# Patient Record
Sex: FEMALE | Race: AMERICAN INDIAN OR ALASKA NATIVE
[De-identification: names, ages, dates, MRNs, and addresses within clinical notes are randomized per-mention and may not be internally consistent; named-entity substitution may affect disease eponyms.]

---

## 2017-11-20 ENCOUNTER — HOSPITAL ENCOUNTER (OUTPATIENT)
Dept: HOSPITAL 5 - GIO | Age: 62
Discharge: HOME | End: 2017-11-20
Attending: INTERNAL MEDICINE
Payer: OTHER GOVERNMENT

## 2017-11-20 VITALS — DIASTOLIC BLOOD PRESSURE: 64 MMHG | SYSTOLIC BLOOD PRESSURE: 108 MMHG

## 2017-11-20 DIAGNOSIS — Z87.891: ICD-10-CM

## 2017-11-20 DIAGNOSIS — M17.9: ICD-10-CM

## 2017-11-20 DIAGNOSIS — D17.79: ICD-10-CM

## 2017-11-20 DIAGNOSIS — D12.0: ICD-10-CM

## 2017-11-20 DIAGNOSIS — K64.8: ICD-10-CM

## 2017-11-20 DIAGNOSIS — Z79.899: ICD-10-CM

## 2017-11-20 DIAGNOSIS — K57.30: ICD-10-CM

## 2017-11-20 DIAGNOSIS — E66.9: ICD-10-CM

## 2017-11-20 DIAGNOSIS — I10: ICD-10-CM

## 2017-11-20 DIAGNOSIS — M19.019: ICD-10-CM

## 2017-11-20 DIAGNOSIS — Z12.11: Primary | ICD-10-CM

## 2017-11-20 PROCEDURE — 45384 COLONOSCOPY W/LESION REMOVAL: CPT

## 2017-11-20 PROCEDURE — 45388 COLONOSCOPY W/ABLATION: CPT

## 2017-11-20 PROCEDURE — 88305 TISSUE EXAM BY PATHOLOGIST: CPT

## 2017-11-20 NOTE — HISTORY AND PHYSICAL REPORT
History of Present Illness


Date of examination: 11/20/17


Chief complaint: 


Colorectal cancer screening





History of present illness: 


Patient is a 62-year-old female who is referred for colorectal cancer 

screening.  Denies any additional complaints.








Past History


Past Medical History: hypertension, other (degenerative arthritis)





Medications and Allergies


 Allergies











Allergy/AdvReac Type Severity Reaction Status Date / Time


 


No Known Allergies Allergy   Unverified 07/07/14 12:42











 Home Medications











 Medication  Instructions  Recorded  Confirmed  Last Taken  Type


 


Meloxicam [Meloxicam] 15 mg PO PRN PRN 11/20/17 11/20/17 11/18/17 09:00 History


 


Telmisartan/Hydrochlorothiazid 1 tab PO DAILY 11/20/17 11/20/17 11/20/17 08:00 

History





[Micardis Hct 80-25 mg Tablet]     











Active Meds: 


Active Medications





Sodium Chloride (Nacl 0.9% 1000 Ml)  1,000 mls @ 50 mls/hr IV AS DIRECT VENKATA


   Last Admin: 11/20/17 12:35 Dose:  400 mls











Review of Systems


All systems: negative





Exam





- Constitutional


Vitals: 


 











Temp Pulse Resp BP Pulse Ox


 


 98.3 F   75   16   141/79    


 


 11/20/17 08:42  11/20/17 08:42  11/20/17 08:42  11/20/17 08:42   











General appearance: Present: no acute distress, well-nourished





- EENT


Eyes: Present: PERRL


ENT: hearing intact, clear oral mucosa





- Neck


Neck: Present: supple, normal ROM





- Respiratory


Respiratory effort: normal


Respiratory: bilateral: CTA





- Cardiovascular


Heart Sounds: Present: S1 & S2.  Absent: rub, click





- Extremities


Extremities: pulses symmetrical, No edema


Peripheral Pulses: within normal limits





- Abdominal


General gastrointestinal: Present: soft, non-tender, non-distended, normal 

bowel sounds


Female genitourinary: Present: normal





- Integumentary


Integumentary: Present: clear, warm, dry





- Musculoskeletal


Musculoskeletal: gait normal, strength equal bilaterally





- Psychiatric


Psychiatric: appropriate mood/affect, intact judgment & insight





- Neurologic


Neurologic: CNII-XII intact, moves all extremities





Assessment and Plan


Colorectal cancer screening.





Plan: Colonoscopy.

## 2017-11-20 NOTE — DISCHARGE SUMMARY
Short Stay Discharge Plan


Activity: advance as tolerated


Weight Bearing Status: Weight Bear as Tolerated


Diet: regular


Forms:  Post Sedation D/C Instructions

## 2017-11-20 NOTE — ANESTHESIA CONSULTATION
Anesthesia Consult and Med Hx


Date of service: 11/20/17





- Airway


Anesthetic Teeth Evaluation: Poor


ROM Head & Neck: Adequate


Mental/Hyoid Distance: Adequate


Mallampati Class: Class II


Intubation Access Assessment: Probably Good





- Pulmonary Exam


CTA: Yes





- Cardiac Exam


Cardiac Exam: RRR





- Pre-Operative Health Status


ASA Pre-Surgery Classification: ASA2


Proposed Anesthetic Plan: MAC





- Pulmonary


Hx Smoking: Yes (2 YRS ONLY IN THE 70'S)


Hx Sleep Apnea: No





- Cardiovascular System


Hx Hypertension: Yes (10+ YRS)


Hx Heart Attack/AMI: No





- Central Nervous System


Hx Neuromuscular Disorder: Yes (OA: Knee and shoulder pain)


Hx Seizures: No


CVA: No





- Other Systems


Hx Cancer: No


Hx Obesity: Yes





- Additional Comments


Anesthesia Medical History Comments: NAC

## 2017-11-20 NOTE — OPERATIVE REPORT
Operative Report


Operative Report: 


Date of procedure: 11/20/2017





Procedure: Colonoscopy with polyp ablation and hot biopsy polypectomy





Attending physician: Jose Velez MD





: Jose Velez MD





Indication: Patient is a 62-year-old female who is referred for colorectal 

cancer screening.  A colonoscopy is done to evaluate patient so that treatment 

may be directed based on the findings.





Consent: Informed consent was obtained after advising the patient and family 

regarding nature of this procedure, its indications, potential benefits as well 

as possible complications including but not limited to bleeding perforation and 

adverse reaction to medication, infection as well as other cardiopulmonary 

complications.  An informed written and verbal consent was then obtained after 

due opportunity was provided for questions and answers.





Monitoring: Patient was monitored continuously with pulse oximetry and 

electrocardiographic recordings as well as blood pressure recordings.  Vital 

signs remained stable throughout this procedure with no untoward events.





Preoperative assessment: Patient was assessed immediately prior to this 

procedure for capacity to tolerate monitored anesthesia care and moderate 

sedation as well as general anesthesia.  Patient's ASA classification is 2, 

Mallampati class is 2, Hyomental distance is 3.





Instrument: Dryadn videocolonoscope





Medications: Propofol, given intravenously in divided doses.  For details 

please refer to anesthesia records.





Description of procedure: Patient was placed in the left lateral decubitus 

position after achieving sedation, a digital rectal examination was performed 

following which the colonoscope was introduced into the anal verge and advanced 

to the cecum which was identified by the cecal valve, the appendiceal orifice, 

as well as by the cecal strap and direct transillumination.  The colonoscope 

was subsequently withdrawn with careful inspection of all mucosal surfaces.  

Patient tolerated this procedure well and was subsequently taken to the 

recovery room.  The following findings were noted.





Findings: Patient had a few scattered diverticula in the sigmoid and descending 

colon.  In the cecum, patient had a sessile polyp measuring approximately 6 mm 

which was removed by hot biopsy polypectomy.  There was an adjoining flat polyp 

measuring approximately 4 mm, which was ablated completely.  Immediately behind 

the ileocecal valve, patient had a lipoma which measured approximately 4 x 5 

cm.  The rest of the ascending colon was normal.  The transverse colon was 

normal.  The descending colon was normal.  The sigmoid colon was normal.  In 

the rectum, patient had 2 diminutive flat polyps that were completely ablated.  

On the retroflex view at the anal verge, patient had internal hemorrhoids.





Impression: Cecal polyp status post hot biopsy polypectomy.


Cecal polyp and rectal polyps status post ablation.


Ascending colon lipoma.


Diverticular disease of the sigmoid and descending colon.


Internal hemorrhoids.





Plan: Follow pathology report


High-fiber diet


Repeat colonoscopy in 5 years if polyp is adenomatous.

## 2017-12-26 ENCOUNTER — HOSPITAL ENCOUNTER (OUTPATIENT)
Dept: HOSPITAL 5 - MAMMO | Age: 62
Discharge: HOME | End: 2017-12-26
Attending: INTERNAL MEDICINE
Payer: OTHER GOVERNMENT

## 2017-12-26 DIAGNOSIS — I10: ICD-10-CM

## 2017-12-26 DIAGNOSIS — Z87.891: ICD-10-CM

## 2017-12-26 DIAGNOSIS — Z12.31: Primary | ICD-10-CM

## 2017-12-26 PROCEDURE — 77067 SCR MAMMO BI INCL CAD: CPT

## 2017-12-26 PROCEDURE — G0202 SCR MAMMO BI INCL CAD: HCPCS

## 2017-12-26 NOTE — MAMMOGRAPHY REPORT
BILATERAL MAMMOGRAM:



FINDINGS:

The breasts are almost entirely fat (<25% glandular).  No mass,

distortion, suspicious calcification, or skin change is seen. Biopsy 

marker in the right retroareolar region. No interval change compared to 

prior exam in July 2014.



CAD was utilized.



IMPRESSION:

Negative mammogram.  There is no mammographic evidence of

malignancy.



RECOMMENDATION:

Follow-up per ACS guidelines.



BI-RADS CATEGORY: 1 = Negative



ACR BI-RADS MAMMOGRAPHIC CODES:

0 = Needs additional imaging evaluation; 1 = Negative; 2 = Benign;

3 = Probably benign; 4 = Suspicious; 5 = Malignant; 6 = Known

biopsy-proven malignancy



COMMENT:

      1.   Dense breast tissue, i.e., adenosis, fibrocystic 

            changes, etc., may obscure an underlying neoplasm.

      2.   Approximately 10% of cancers are not detected with

            mammography.

      3.   A negative mammography report should not delay biopsy 

            if a clinically suspicious mass is present.



COMMENT:

Patient follow-up letters are generated in [employer].

## 2020-01-20 ENCOUNTER — HOSPITAL ENCOUNTER (OUTPATIENT)
Dept: HOSPITAL 5 - MAMMO | Age: 65
Discharge: HOME | End: 2020-01-20
Attending: STUDENT IN AN ORGANIZED HEALTH CARE EDUCATION/TRAINING PROGRAM
Payer: MEDICARE

## 2020-01-20 DIAGNOSIS — N64.89: ICD-10-CM

## 2020-01-20 DIAGNOSIS — Z12.31: Primary | ICD-10-CM

## 2020-01-20 PROCEDURE — 77067 SCR MAMMO BI INCL CAD: CPT

## 2020-01-20 NOTE — MAMMOGRAPHY REPORT
DIGITAL SCREENING MAMMOGRAM WITH CAD, 1/20/2020



INDICATION: Routine screening mammography. 



TECHNIQUE:  Digital bilateral  2D mammography was obtained in the craniocaudal and mediolateral obliq
ue projections. This examination was interpreted with the benefit of Computer-Aided Detection analysi
s.



COMPARISON: 12/26/2017



FINDINGS: 



Breast Density: The breasts are almost entirely fatty.



There is no evidence of dominant mass, suspicious calcifications or architectural distortion in eithe
r breast. A right retroareolar biopsy clip.



IMPRESSION: No mammographic evidence of malignancy.





Follow up recommendation: Routine yearly



BI-RADS Category 2:  Benign.



A "normal" or negative report should not discourage follow up or biopsy of a clinically significant f
inding.



A written summary of these findings will be mailed to the patient. The patient will be entered into a
 mammography reporting system which will generate a reminder letter for the patient's next appointmen
t at the appropriate interval.



The [employer] recommends yearly mammograms starting at age 40 and continuing as l
maria de jesus as a woman is in good health.  Breast MRI is recommended for women with an approximate 20-25% or 
greater lifetime risk of breast cancer, including women with a strong family history of breast or ova
nima cancer or who have been treated for Hodgkin's disease.



Signer Name: Emery Sanders MD 

Signed: 1/20/2020 1:33 PM

 Workstation Name: [id number]